# Patient Record
Sex: FEMALE | Race: WHITE | Employment: FULL TIME | URBAN - METROPOLITAN AREA
[De-identification: names, ages, dates, MRNs, and addresses within clinical notes are randomized per-mention and may not be internally consistent; named-entity substitution may affect disease eponyms.]

---

## 2017-02-27 ENCOUNTER — TELEPHONE (OUTPATIENT)
Dept: OTHER | Facility: CLINIC | Age: 47
End: 2017-02-27

## 2017-10-26 VITALS
HEART RATE: 74 BPM | HEIGHT: 67 IN | DIASTOLIC BLOOD PRESSURE: 76 MMHG | BODY MASS INDEX: 26.21 KG/M2 | WEIGHT: 167 LBS | SYSTOLIC BLOOD PRESSURE: 118 MMHG

## 2017-10-30 ENCOUNTER — RESULT FOLLOW UP (OUTPATIENT)
Dept: OBGYN | Facility: CLINIC | Age: 47
End: 2017-10-30

## 2017-10-30 ENCOUNTER — OFFICE VISIT (OUTPATIENT)
Dept: OBGYN | Facility: CLINIC | Age: 47
End: 2017-10-30

## 2017-10-30 ENCOUNTER — RADIANT APPOINTMENT (OUTPATIENT)
Dept: MAMMOGRAPHY | Facility: CLINIC | Age: 47
End: 2017-10-30
Payer: COMMERCIAL

## 2017-10-30 VITALS
DIASTOLIC BLOOD PRESSURE: 80 MMHG | WEIGHT: 166 LBS | SYSTOLIC BLOOD PRESSURE: 120 MMHG | BODY MASS INDEX: 26.06 KG/M2 | HEIGHT: 67 IN

## 2017-10-30 DIAGNOSIS — R87.810 ASCUS WITH POSITIVE HIGH RISK HPV CERVICAL: ICD-10-CM

## 2017-10-30 DIAGNOSIS — Z01.419 ENCOUNTER FOR GYNECOLOGICAL EXAMINATION WITHOUT ABNORMAL FINDING: Primary | ICD-10-CM

## 2017-10-30 DIAGNOSIS — Z12.31 VISIT FOR SCREENING MAMMOGRAM: ICD-10-CM

## 2017-10-30 DIAGNOSIS — Z23 NEED FOR PNEUMOCOCCAL VACCINE: ICD-10-CM

## 2017-10-30 DIAGNOSIS — R87.610 ASCUS WITH POSITIVE HIGH RISK HPV CERVICAL: ICD-10-CM

## 2017-10-30 DIAGNOSIS — Z92.29 HISTORY OF HORMONE REPLACEMENT THERAPY: ICD-10-CM

## 2017-10-30 DIAGNOSIS — Z23 NEED FOR VACCINATION: ICD-10-CM

## 2017-10-30 DIAGNOSIS — Z23 NEED FOR PROPHYLACTIC VACCINATION AND INOCULATION AGAINST INFLUENZA: ICD-10-CM

## 2017-10-30 PROCEDURE — 90471 IMMUNIZATION ADMIN: CPT | Performed by: OBSTETRICS & GYNECOLOGY

## 2017-10-30 PROCEDURE — 90472 IMMUNIZATION ADMIN EACH ADD: CPT | Performed by: OBSTETRICS & GYNECOLOGY

## 2017-10-30 PROCEDURE — 88175 CYTOPATH C/V AUTO FLUID REDO: CPT | Performed by: OBSTETRICS & GYNECOLOGY

## 2017-10-30 PROCEDURE — 87624 HPV HI-RISK TYP POOLED RSLT: CPT | Performed by: OBSTETRICS & GYNECOLOGY

## 2017-10-30 PROCEDURE — 00000159 ZZHCL STATISTIC H-SEND OUTS PREP: Performed by: OBSTETRICS & GYNECOLOGY

## 2017-10-30 PROCEDURE — 90670 PCV13 VACCINE IM: CPT | Performed by: OBSTETRICS & GYNECOLOGY

## 2017-10-30 PROCEDURE — 90686 IIV4 VACC NO PRSV 0.5 ML IM: CPT | Performed by: OBSTETRICS & GYNECOLOGY

## 2017-10-30 PROCEDURE — 88141 CYTOPATH C/V INTERPRET: CPT | Performed by: OBSTETRICS & GYNECOLOGY

## 2017-10-30 PROCEDURE — 99396 PREV VISIT EST AGE 40-64: CPT | Mod: 25 | Performed by: OBSTETRICS & GYNECOLOGY

## 2017-10-30 PROCEDURE — G0202 SCR MAMMO BI INCL CAD: HCPCS | Mod: TC

## 2017-10-30 RX ORDER — ESTRADIOL 0.75 MG/1.25G
0.75 GEL TOPICAL DAILY
Status: CANCELLED | OUTPATIENT
Start: 2017-10-30

## 2017-10-30 ASSESSMENT — ANXIETY QUESTIONNAIRES
7. FEELING AFRAID AS IF SOMETHING AWFUL MIGHT HAPPEN: NOT AT ALL
5. BEING SO RESTLESS THAT IT IS HARD TO SIT STILL: NOT AT ALL
IF YOU CHECKED OFF ANY PROBLEMS ON THIS QUESTIONNAIRE, HOW DIFFICULT HAVE THESE PROBLEMS MADE IT FOR YOU TO DO YOUR WORK, TAKE CARE OF THINGS AT HOME, OR GET ALONG WITH OTHER PEOPLE: NOT DIFFICULT AT ALL
2. NOT BEING ABLE TO STOP OR CONTROL WORRYING: NOT AT ALL
GAD7 TOTAL SCORE: 0
1. FEELING NERVOUS, ANXIOUS, OR ON EDGE: NOT AT ALL
6. BECOMING EASILY ANNOYED OR IRRITABLE: NOT AT ALL
3. WORRYING TOO MUCH ABOUT DIFFERENT THINGS: NOT AT ALL

## 2017-10-30 ASSESSMENT — PATIENT HEALTH QUESTIONNAIRE - PHQ9
SUM OF ALL RESPONSES TO PHQ QUESTIONS 1-9: 0
5. POOR APPETITE OR OVEREATING: NOT AT ALL

## 2017-10-30 NOTE — MR AVS SNAPSHOT
"              After Visit Summary   10/30/2017    Molly Levin    MRN: [id number]           Patient Information     Date Of Birth          1970        Visit Information        Provider Department      10/30/2017 9:00 AM Erma Aparicio MD [facility]        Today's Diagnoses     Encounter for gynecological examination without abnormal finding    -  1    Need for prophylactic vaccination and inoculation against influenza        Need for pneumococcal vaccine        ASCUS with positive high risk HPV cervical        Need for vaccination        History of hormone replacement therapy           Follow-ups after your visit        Who to contact     If you have questions or need follow up information about today's clinic visit or your schedule please contact [facility] directly at [phone].  Normal or non-critical lab and imaging results will be communicated to you by MyChart, letter or phone within 4 business days after the clinic has received the results. If you do not hear from us within 7 days, please contact the clinic through EndoSpherehart or phone. If you have a critical or abnormal lab result, we will notify you by phone as soon as possible.  Submit refill requests through Plumzi or call your pharmacy and they will forward the refill request to us. Please allow 3 business days for your refill to be completed.          Additional Information About Your Visit        MyChart Information     Plumzi lets you send messages to your doctor, view your test results, renew your prescriptions, schedule appointments and more. To sign up, go to [email or web address] . Click on \"Log in\" on the left side of the screen, which will take you to the Welcome page. Then click on \"Sign up Now\" on the right side of the page.     You will be asked to enter the access code listed below, as well as some personal information. Please follow the directions to create your username and " "password.     Your access code is: [email or web address]  Expires: 2018 12:47 PM     Your access code will  in 90 days. If you need help or a new code, please call your [place] clinic or [phone].        Care EveryWhere ID     This is your Care EveryWhere ID. This could be used by other organizations to access your [place] medical records  [id number]        Your Vitals Were     Height Last Period Breastfeeding? BMI (Body Mass Index)          5' 7\" (1.702 m) 10/27/2017 (Exact Date) No 26 kg/m2         Blood Pressure from Last 3 Encounters:   10/30/17 120/80   07/15/15 118/76   14 128/86    Weight from Last 3 Encounters:   10/30/17 166 lb (75.3 kg)   07/15/15 167 lb (75.8 kg)              We Performed the Following     1st  Administration  [65047]     FLU VAC, SPLIT VIRUS IM > 3 YO (QUADRIVALENT) [00978]     HPV High Risk Types DNA Cervical     Pap imaged thin layer screen with HPV - recommended age 30 - 65     Pneumococcal vaccine 13 valent PCV13 IM (Prevnar) [29807]     Vaccine Administration, Each Additional [92796]        Primary Care Provider Office Phone # Fax #    [facility] [phone]       [address] [place] [address], [address]  [facility] [address]        Equal Access to Services     [facility] : Hadii eli young hadasho Sotacosali, waaxda luqadaha, qaybta kaalmada danelle, silvia fonseca . So [facility] [phone].    ATENCIÓN: Si habla español, tiene a garcia disposición servicios gratuitos de asistencia lingüística. Llame al [phone].    We comply with applicable federal civil rights laws and [place] laws. We do not discriminate on the basis of race, color, national origin, age, disability, sex, sexual orientation, or gender identity.            Thank you!     Thank you for choosing [facility]  for your care. Our goal is always to provide you with excellent care. Hearing back from our patients is one way we can " continue to improve our services. Please take a few minutes to complete the written survey that you may receive in the mail after your visit with us. Thank you!             Your Updated Medication List - Protect others around you: Learn how to safely use, store and throw away your medicines at www.disposemymeds.org.          This list is accurate as of: 10/30/17 12:47 PM.  Always use your most recent med list.                   Brand Name Dispense Instructions for use Diagnosis    estradiol 0.75 MG/1.25 GM (0.06%) Gel topical gel    ESTROGEL     Place 0.75 mg onto the skin daily        progesterone vaginal cream

## 2017-10-30 NOTE — PROGRESS NOTES
Molly is a 47 year old  female who presents for annual exam.     Besides routine health maintenance, she has no other health concerns today .    HPI: Molly presents for annual exam. States that she is still having her menses on hormone replacement. She was placed on hormone replacement 8 years prior by her PCP. At the time she was having vaginal dryness and hot flushes. She has not yet had a 12 month period without menses. She actually states that she continues to have Mittleschmertz monthly. The hormone replacement caused her hot flushes to stop and treated her vaginal dryness as well. She uses the creams topically on her skin. She uses the estrogen in the AM and the progesterone in the PM.      GYNECOLOGIC HISTORY:    Patient's last menstrual period was 10/27/2017 (exact date).  Her current contraception method is: vasectomy.  She  reports that she has never smoked. She has never used smokeless tobacco.      Patient is sexually active.  STD testing offered?  Declined  Last PHQ-9 score on record =   PHQ-9 SCORE 10/30/2017   Total Score 0     Last GAD7 score on record =   LINDA-7 SCORE 10/30/2017   Total Score 0     Alcohol Score = 2    HEALTH MAINTENANCE:  Cholesterol: Done with PCP  Last Mammo: 2015, Result: normal, Next Mammo: today   Pap: 2015 - ASC-US, HPV+   Colonoscopy:  Never, Result: not applicable, Next Colonoscopy: Age 49  Dexa: Never    Health maintenance updated:  yes    HISTORY:  Obstetric History       T2      L2     SAB0   TAB0   Ectopic0   Multiple0   Live Births2       # Outcome Date GA Lbr Salvador/2nd Weight Sex Delivery Anes PTL Lv   2 Term         ADORE   1 Term         ADORE          Patient Active Problem List   Diagnosis     History of hormone replacement therapy     ASCUS with positive high risk HPV cervical     Past Surgical History:   Procedure Laterality Date     LAPAROSCOPY      lysis of adhesions     SURGICAL PATHOLOGY EXAM      LEEP CONE: ANDREA III  per records  "from her primary doctor      Social History   Substance Use Topics     Smoking status: Never Smoker     Smokeless tobacco: Never Used     Alcohol use 1.0 oz/week     2 Glasses of wine per week           Current Outpatient Prescriptions   Medication Sig     progesterone vaginal cream      estradiol (ESTROGEL) 0.75 MG/1.25 GM (0.06%) GEL Place 0.75 mg onto the skin daily     No current facility-administered medications for this visit.      No Known Allergies    Past medical, surgical, social and family histories were reviewed and updated in EPIC.    ROS:   12 point review of systems negative other than symptoms noted below.    EXAM:  /80  Ht 5' 7\" (1.702 m)  Wt 166 lb (75.3 kg)  LMP 10/27/2017 (Exact Date)  Breastfeeding? No  BMI 26 kg/m2   BMI: Body mass index is 26 kg/(m^2).    PHYSICAL EXAM:  Constitutional:  Appearance: Well nourished, well developed, alert, in no acute distress  Neck:  Lymph Nodes:  No lymphadenopathy present    Thyroid:  Gland size normal, nontender, no nodules or masses present on palpation  Chest:  Respiratory Effort:  Breathing unlabored, clear to auscultation bilaterally  Cardiovascular:    Heart: Auscultation:  Regular rate, normal rhythm, no murmurs present  Breasts: Palpation of Breasts and Axillae:  Fibrous and dense breasts bilaterally. No masses present on palpation, no breast tenderness., Axillary Lymph Nodes:  No lymphadenopathy present., No nodularity, asymmetry or nipple discharge bilaterally. and On inspection no skin changes.  Gastrointestinal:   Abdominal Examination:  Abdomen nontender to palpation, tone normal without rigidity or guarding, no masses present, umbilicus without lesions   Liver and Spleen:  No hepatomegaly present, liver nontender to palpation    Hernias:  No hernias present  Lymphatic: Lymph Nodes:  No other lymphadenopathy present  Skin:  General Inspection:  No rashes present, no lesions present, no areas of  discoloration    Genitalia and Groin:  " No rashes present, no lesions present, no areas of  discoloration, no masses present  Neurologic/Psychiatric:    Mental Status:  Oriented X3     Pelvic Exam:  External Genitalia:     Normal appearance for age, no discharge present, no tenderness present, no inflammatory lesions present, color normal  Vagina:     Normal vaginal vault without central or paravaginal defects, minimal menses in vaginal vault. No discharge present, no inflammatory lesions present, no masses present  Bladder:     Nontender to palpation  Urethra:   Urethral Body:  Urethra palpation normal, urethra structural support normal   Urethral Meatus:  No erythema or lesions present  Cervix:     Appearance healthy, no lesions present, nontender to palpation, minimal menses present.  Uterus:     Uterus: firm, normal sized and nontender, anteverted in position.   Adnexa:     No adnexal tenderness present, no adnexal masses present  Perineum:     Perineum within normal limits, no evidence of trauma, no rashes or skin lesions present  Anus:     Anus within normal limits, no hemorrhoids present  Inguinal Lymph Nodes:     No lymphadenopathy present  Pubic Hair:     Normal pubic hair distribution for age  Genitalia and Groin:     No rashes present, no lesions present, no areas of discoloration, no masses present      COUNSELING:   Reviewed preventive health counseling, as reflected in patient instructions       Regular exercise       Healthy diet/nutrition    BMI: Body mass index is 26 kg/(m^2).      ASSESSMENT:  47 year old female with satisfactory annual exam.    ICD-10-CM    1. Encounter for gynecological examination without abnormal finding Z01.419    2. Need for prophylactic vaccination and inoculation against influenza Z23 Pneumococcal vaccine 13 valent PCV13 IM (Prevnar) [86552]     1st  Administration  [48615]     FLU VAC, SPLIT VIRUS IM > 3 YO (QUADRIVALENT) [29941]     Vaccine Administration, Each Additional [56134]   3. Need for pneumococcal  vaccine Z23    4. ASCUS with positive high risk HPV cervical R87.610 Pap imaged thin layer screen with HPV - recommended age 30 - 65    R87.810 HPV High Risk Types DNA Cervical   5. Need for vaccination Z23    6. History of hormone replacement therapy Z79.890        PLAN:  Pap smear performed as last pap smear was ASCUS/high risk HPV in 2015.  Vaccination given today  Hormone replacement therapy: as I am not familiar with the specific regimen Molly is on I deferred her refill to her original prescribing provider and she agreed. I did discuss with her that if she develops abnormal uterine bleeding we should investigate with an ultrasound and endometrial biopsy. I informed her of the importance of estrogen and progesterone both being balanced in HRT to ensure a normal endometrial lining. I discussed unopposed estrogen and the increased risk of uterine malignancy. I admitted to her that I am unfamiliar with the absorbed levels or either hormone in the specific regimen she is using.      Erma Aparicio MD

## 2017-10-30 NOTE — LETTER
November 13, 2018      Molly Levin  [address] [place] [address]    Dear MsAnalisaOllie,      At [facility], your health and wellness is our primary concern. That is why we are following up on a colposcopy from 11/20/17. Your provider had recommended that you have a Pap smear and HPV test completed by 11/20/18. Our records do not show that this has been scheduled.    It is important to complete the follow up that your provider has suggested for you to ensure that there are no worsening changes which may, over time, develop into cancer.      Please contact our office at  [phone] to schedule an appointment for a Pap smear and HPV test at your earliest convenience. If you have questions or concerns, please call the clinic and we will be happy to assist you.    If you have completed the tests outside of [facility], please have the results forwarded to our office. We will update the chart for your primary Physician to review before your next annual physical.     Thank you for choosing [facility]!    Sincerely,      Erma Aparicio MD/[facility]

## 2017-10-30 NOTE — PROGRESS NOTES
Injectable Influenza Immunization Documentation    1.  Is the person to be vaccinated sick today?   No    2. Does the person to be vaccinated have an allergy to a component   of the vaccine?   No  Egg Allergy Algorithm Link    3. Has the person to be vaccinated ever had a serious reaction   to influenza vaccine in the past?   No    4. Has the person to be vaccinated ever had Guillain-Barré syndrome?   No    Form completed by Casie Jones, DUNIA    Screening Questionnaire for Adult Immunization    Are you sick today?   No   Do you have allergies to medications, food, a vaccine component or latex?   No   Have you ever had a serious reaction after receiving a vaccination?   No   Do you have a long-term health problem with heart disease, lung disease, asthma, kidney disease, metabolic disease (e.g. diabetes), anemia, or other blood disorder?   No   Do you have cancer, leukemia, HIV/AIDS, or any other immune system problem?   No   In the past 3 months, have you taken medications that affect  your immune system, such as prednisone, other steroids, or anticancer drugs; drugs for the treatment of rheumatoid arthritis, Crohn s disease, or psoriasis; or have you had radiation treatments?   No   Have you had a seizure, or a brain or other nervous system problem?   No   During the past year, have you received a transfusion of blood or blood     products, or been given immune (gamma) globulin or antiviral drug?   No   For women: Are you pregnant or is there a chance you could become        pregnant during the next month?   No   Have you received any vaccinations in the past 4 weeks?   No     Immunization questionnaire answers were all negative.        Per orders of Dr. Aparicio, injection of Prevnar given by Casie Jones. Patient instructed to remain in clinic for 15 minutes afterwards, and to report any adverse reaction to me immediately.       Screening performed by Casie Jones on 10/30/2017 at 10:06 AM.

## 2017-10-31 ASSESSMENT — ANXIETY QUESTIONNAIRES: GAD7 TOTAL SCORE: 0

## 2017-11-03 LAB
COPATH REPORT: ABNORMAL
PAP: ABNORMAL

## 2017-11-06 LAB
FINAL DIAGNOSIS: ABNORMAL
HPV HR 12 DNA CVX QL NAA+PROBE: POSITIVE
HPV16 DNA SPEC QL NAA+PROBE: NEGATIVE
HPV18 DNA SPEC QL NAA+PROBE: NEGATIVE
SPECIMEN DESCRIPTION: ABNORMAL

## 2017-11-07 NOTE — PROGRESS NOTES
2000 LEEP cone ANDREA III  6/26/15 ASCUS/+ HR HPV. Plan: colpo  7/15/15 Colpo-benign   10/30/17 ASCUS/+ HR HPV (not 16/18).endometrial cells noted.  LMP 10/27/17.  Plan: colposcopy by 1/30/18 11/9/17 pt advised of result and follow up  11/20/17 Colpo. Bx/ECC-benign.  Plan: cotest in 12 months.   11/13/18 Cotest reminder letter sent. ([facility])  12/04/18 LMTC and schedule at [facility]. ([facility])  12/18/18 Patient is lost to pap tracking follow-up. FYI routed to provider. ([facility])

## 2017-11-16 ENCOUNTER — TELEPHONE (OUTPATIENT)
Dept: OBGYN | Facility: CLINIC | Age: 47
End: 2017-11-16

## 2017-11-16 NOTE — TELEPHONE ENCOUNTER
"Informed pt of results: \"Breast Imaging Result: We are pleased to inform you that the results of your recent breast imaging show no evidence of malignancy (cancer).\"  She stated understanding and had no further questions.   "

## 2017-11-20 ENCOUNTER — OFFICE VISIT (OUTPATIENT)
Dept: OBGYN | Facility: CLINIC | Age: 47
End: 2017-11-20
Payer: COMMERCIAL

## 2017-11-20 VITALS — SYSTOLIC BLOOD PRESSURE: 130 MMHG | DIASTOLIC BLOOD PRESSURE: 72 MMHG | BODY MASS INDEX: 26.06 KG/M2 | WEIGHT: 166.4 LBS

## 2017-11-20 DIAGNOSIS — Z01.812 PRE-PROCEDURE LAB EXAM: ICD-10-CM

## 2017-11-20 DIAGNOSIS — R87.810 ASCUS WITH POSITIVE HIGH RISK HPV CERVICAL: Primary | ICD-10-CM

## 2017-11-20 DIAGNOSIS — R87.610 ASCUS WITH POSITIVE HIGH RISK HPV CERVICAL: Primary | ICD-10-CM

## 2017-11-20 LAB — BETA HCG QUAL IFA URINE: NEGATIVE

## 2017-11-20 PROCEDURE — 84703 CHORIONIC GONADOTROPIN ASSAY: CPT | Performed by: OBSTETRICS & GYNECOLOGY

## 2017-11-20 PROCEDURE — 88305 TISSUE EXAM BY PATHOLOGIST: CPT | Performed by: OBSTETRICS & GYNECOLOGY

## 2017-11-20 PROCEDURE — 57454 BX/CURETT OF CERVIX W/SCOPE: CPT | Performed by: OBSTETRICS & GYNECOLOGY

## 2017-11-20 ASSESSMENT — PATIENT HEALTH QUESTIONNAIRE - PHQ9: SUM OF ALL RESPONSES TO PHQ QUESTIONS 1-9: 0

## 2017-11-20 NOTE — MR AVS SNAPSHOT
"              After Visit Summary   2017    Molly Levin    MRN: [id number]           Patient Information     Date Of Birth          1970        Visit Information        Provider Department      2017 9:40 AM Erma Aparicio MD; [facility]        Today's Diagnoses     ASCUS with positive high risk HPV cervical    -  1    Pre-procedure lab exam           Follow-ups after your visit        Who to contact     If you have questions or need follow up information about today's clinic visit or your schedule please contact [facility] directly at [phone].  Normal or non-critical lab and imaging results will be communicated to you by MyChart, letter or phone within 4 business days after the clinic has received the results. If you do not hear from us within 7 days, please contact the clinic through LearnStreethart or phone. If you have a critical or abnormal lab result, we will notify you by phone as soon as possible.  Submit refill requests through Potential or call your pharmacy and they will forward the refill request to us. Please allow 3 business days for your refill to be completed.          Additional Information About Your Visit        MyChart Information     Potential lets you send messages to your doctor, view your test results, renew your prescriptions, schedule appointments and more. To sign up, go to [email or web address] . Click on \"Log in\" on the left side of the screen, which will take you to the Welcome page. Then click on \"Sign up Now\" on the right side of the page.     You will be asked to enter the access code listed below, as well as some personal information. Please follow the directions to create your username and password.     Your access code is: [email or web address]  Expires: 2018 11:47 AM     Your access code will  in 90 days. If you need help or a new code, please call your [facility] or [phone].        Care " EveryWhere ID     This is your Care EveryWhere ID. This could be used by other organizations to access your [place] medical records  [id number]        Your Vitals Were     Last Period BMI (Body Mass Index)                10/27/2017 (Exact Date) 26.06 kg/m2           Blood Pressure from Last 3 Encounters:   11/20/17 130/72   10/30/17 120/80   07/15/15 118/76    Weight from Last 3 Encounters:   11/20/17 166 lb 6.4 oz (75.5 kg)   10/30/17 166 lb (75.3 kg)   07/15/15 167 lb (75.8 kg)              We Performed the Following     Beta HCG qual IFA urine - [facility] and [facility]     COLPOSCOPY CERVIX/UPPER VAGINA W BX CERVIX     Surgical pathology exam        Primary Care Provider Office Phone # Fax #    [facility] [phone]       [facility], [address]  [facility] [address]        Equal Access to Services     BLOSSOM RAMESH AH: Hadii aad ku hadasho Soomaali, waaxda luqadaha, qaybta kaalmada adeegyada, waxay enriquetain hayyuniorn ramila fonseca . So [facility] [phone].    ATENCIÓN: Si jae awad, tiene a garcia disposición servicios gratuitos de asistencia lingüística. Llame al [phone].    We comply with applicable federal civil rights laws and [place] laws. We do not discriminate on the basis of race, color, national origin, age, disability, sex, sexual orientation, or gender identity.            Thank you!     Thank you for choosing [facility] [place]  for your care. Our goal is always to provide you with excellent care. Hearing back from our patients is one way we can continue to improve our services. Please take a few minutes to complete the written survey that you may receive in the mail after your visit with us. Thank you!             Your Updated Medication List - Protect others around you: Learn how to safely use, store and throw away your medicines at www.disposemymeds.org.          This list is accurate as of: 11/20/17 10:30 AM.  Always use your most  recent med list.                   Brand Name Dispense Instructions for use Diagnosis    estradiol 0.75 MG/1.25 GM (0.06%) Gel topical gel    ESTROGEL     Place 0.75 mg onto the skin daily        progesterone vaginal cream

## 2017-11-20 NOTE — PROGRESS NOTES
INDICATIONS:                                                    Is a pregnancy test required: Yes.  Was it positive or negative?  Negative  Was a consent obtained?  Yes    Today's PHQ-2 Score:   PHQ-2 ( 1999 Pfizer) 11/20/2017   Q1: Little interest or pleasure in doing things 0   Q2: Feeling down, depressed or hopeless 0   PHQ-2 Score 0     Today's PHQ-9 Score:    PHQ-9 SCORE 11/20/2017   Total Score 0     Today's GAD7 Score: 0    Molly Levin, is a 47 year old female, who had a recent ASCUS pap.  HPV positive.  Yes prior history of abnormal pap. ASCUS/ HR HPV in 2015 with negative colposcopy. She had a LEEP procedure performed in 1999. Here today for colposcopy. Discussed indication, risks of infection and bleeding. Stated that she would like to avoid a hysterectomy if possible.    Her last pap was   Lab Results   Component Value Date    PAP ASC-US 10/30/2017    .    PROCEDURE:                                                      Cervix is stained with acetic acid and viewed colposcopically. Attenuated cervix from prior LEEP. Squamocolumnar junction is visualized in it's entirity. acetowhite lesion(s) noted at 12 and 6 o'clock . Biopsy done Yes. Endocervical curretage Done             POST PROCEDURE:                                                      IMPRESSION: colposcopy adequate, HPV and ANDREA 1-2    PLAN : Await the results of the biopsies.  Repeat pap in 12 months.  She  tolerated the procedure well. There were no complications. Patient was discharged in stable condition.    Patient advised to call the clinic if excessive bleeding, pelvic pain, or fever.     Follow-up plan based on pathology results.    Erma Aparicio MD  [facility]

## 2017-11-22 LAB — COPATH REPORT: NORMAL

## 2018-12-04 ENCOUNTER — TELEPHONE (OUTPATIENT)
Dept: OBGYN | Facility: CLINIC | Age: 48
End: 2018-12-04

## 2018-12-04 NOTE — TELEPHONE ENCOUNTER
Pt is past due for f/u pap smear after colposcopy.  LMTC and schedule at [facility].  Shanna Tirado,    Pap Tracking

## 2019-03-18 ENCOUNTER — OFFICE VISIT (OUTPATIENT)
Dept: OBGYN | Facility: CLINIC | Age: 49
End: 2019-03-18
Payer: COMMERCIAL

## 2019-03-18 ENCOUNTER — ANCILLARY PROCEDURE (OUTPATIENT)
Dept: MAMMOGRAPHY | Facility: CLINIC | Age: 49
End: 2019-03-18
Payer: COMMERCIAL

## 2019-03-18 VITALS
SYSTOLIC BLOOD PRESSURE: 112 MMHG | HEART RATE: 72 BPM | HEIGHT: 67 IN | BODY MASS INDEX: 23.07 KG/M2 | WEIGHT: 147 LBS | DIASTOLIC BLOOD PRESSURE: 72 MMHG

## 2019-03-18 DIAGNOSIS — R87.610 ASCUS WITH POSITIVE HIGH RISK HPV CERVICAL: ICD-10-CM

## 2019-03-18 DIAGNOSIS — Z12.31 VISIT FOR SCREENING MAMMOGRAM: ICD-10-CM

## 2019-03-18 DIAGNOSIS — Z01.419 ENCOUNTER FOR GYNECOLOGICAL EXAMINATION WITHOUT ABNORMAL FINDING: Primary | ICD-10-CM

## 2019-03-18 DIAGNOSIS — R87.810 ASCUS WITH POSITIVE HIGH RISK HPV CERVICAL: ICD-10-CM

## 2019-03-18 PROCEDURE — 88141 CYTOPATH C/V INTERPRET: CPT | Performed by: OBSTETRICS & GYNECOLOGY

## 2019-03-18 PROCEDURE — 88175 CYTOPATH C/V AUTO FLUID REDO: CPT | Performed by: OBSTETRICS & GYNECOLOGY

## 2019-03-18 PROCEDURE — 87624 HPV HI-RISK TYP POOLED RSLT: CPT | Performed by: OBSTETRICS & GYNECOLOGY

## 2019-03-18 PROCEDURE — 77067 SCR MAMMO BI INCL CAD: CPT | Mod: TC

## 2019-03-18 PROCEDURE — 99396 PREV VISIT EST AGE 40-64: CPT | Performed by: OBSTETRICS & GYNECOLOGY

## 2019-03-18 PROCEDURE — 77063 BREAST TOMOSYNTHESIS BI: CPT | Mod: TC

## 2019-03-18 ASSESSMENT — ANXIETY QUESTIONNAIRES
7. FEELING AFRAID AS IF SOMETHING AWFUL MIGHT HAPPEN: NOT AT ALL
GAD7 TOTAL SCORE: 0
5. BEING SO RESTLESS THAT IT IS HARD TO SIT STILL: NOT AT ALL
2. NOT BEING ABLE TO STOP OR CONTROL WORRYING: NOT AT ALL
6. BECOMING EASILY ANNOYED OR IRRITABLE: NOT AT ALL
3. WORRYING TOO MUCH ABOUT DIFFERENT THINGS: NOT AT ALL
1. FEELING NERVOUS, ANXIOUS, OR ON EDGE: NOT AT ALL
IF YOU CHECKED OFF ANY PROBLEMS ON THIS QUESTIONNAIRE, HOW DIFFICULT HAVE THESE PROBLEMS MADE IT FOR YOU TO DO YOUR WORK, TAKE CARE OF THINGS AT HOME, OR GET ALONG WITH OTHER PEOPLE: NOT DIFFICULT AT ALL

## 2019-03-18 ASSESSMENT — MIFFLIN-ST. JEOR: SCORE: 1320.45

## 2019-03-18 ASSESSMENT — PATIENT HEALTH QUESTIONNAIRE - PHQ9
SUM OF ALL RESPONSES TO PHQ QUESTIONS 1-9: 0
5. POOR APPETITE OR OVEREATING: NOT AT ALL

## 2019-03-18 NOTE — PROGRESS NOTES
Molly is a 48 year old  female who presents for annual exam.     Besides routine health maintenance, she has no other health concerns today .    HPI:  The patient's PCP is [facility].  Molly presents for an annual GYN visit and a repeat pap smear. Since her last visit she has made some lifestyle changes of eliminating sugar, dairy and gluten from her diet. She is also exercising more and has lost 20 pounds and feels great. 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 She lives at home with her  and denies intimate partner violence.       GYNECOLOGIC HISTORY:    Patient's last menstrual period was 12/15/2018 (approximate).  Her current contraception method is: vasectomy.  She  reports that she has quit smoking. she has never used smokeless tobacco.    Patient is sexually active.  STD testing offered?  Declined  Last PHQ-9 score on record =   PHQ-9 SCORE 3/18/2019   PHQ-9 Total Score 0     Last GAD7 score on record =   LINDA-7 SCORE 3/18/2019   Total Score 0     Alcohol Score = 2    HEALTH MAINTENANCE:  Cholesterol: None found.  Last Mammo: 10/30/17, Result: normal, Next Mammo: today   Pap:   Lab Results   Component Value Date    PAP ASC-US, HPV+ 10/30/2017    Colpo normal  Colonoscopy:  Never, Result: not applicable, Next Colonoscopy: 1 year.  Dexa:  never    Health maintenance updated:  no, will do pap w/ co-testing today, along with mammo    HISTORY:  Obstetric History       T2      L2     SAB0   TAB0   Ectopic0   Multiple0   Live Births2       # Outcome Date GA Lbr Salvador/2nd Weight Sex Delivery Anes PTL Lv   2 Term         ADORE   1 Term         ADORE          Patient Active Problem List   Diagnosis     History of hormone replacement therapy     ASCUS with positive high risk HPV cervical     Past Surgical History:   Procedure Laterality Date     LAPAROSCOPY      lysis of adhesions     SURGICAL PATHOLOGY EXAM      LEEP CONE: ANDREA III  per records from  "her primary doctor      Social History     Tobacco Use     Smoking status: Former Smoker     Smokeless tobacco: Never Used     Tobacco comment: in the 80s   Substance Use Topics     Alcohol use: Yes     Alcohol/week: 1.0 oz     Types: 2 Glasses of wine per week           Current Outpatient Medications   Medication Sig     estradiol (ESTROGEL) 0.75 MG/1.25 GM (0.06%) GEL Place 0.75 mg onto the skin daily     progesterone vaginal cream      No current facility-administered medications for this visit.      No Known Allergies    Past medical, surgical, social and family histories were reviewed and updated in EPIC.    ROS:   12 point review of systems negative other than symptoms noted below.  Genitourinary: Hot Flashes, Night Sweats and No Periods    EXAM:  /72   Pulse 72   Ht 1.695 m (5' 6.75\")   Wt 66.7 kg (147 lb)   LMP 12/15/2018 (Approximate)   BMI 23.20 kg/m     BMI: Body mass index is 23.2 kg/m .    PHYSICAL EXAM:  Constitutional:  Appearance: Well nourished, well developed, alert, in no acute distress  Neck:  Lymph Nodes:  No lymphadenopathy present    Thyroid:  Gland size normal, nontender, no nodules or masses present on palpation  Chest:  Respiratory Effort:  Breathing unlabored, CTAB  Cardiovascular:    Heart: Auscultation:  Regular rate, normal rhythm, no murmurs present  Breasts: Palpation of Breasts and Axillae:  No masses present on palpation, no breast tenderness., Axillary Lymph Nodes:  No lymphadenopathy present., No nodularity, asymmetry or nipple discharge bilaterally. and no skin changes noted.  Gastrointestinal:   Abdominal Examination:  Abdomen nontender to palpation, tone normal without rigidity or guarding, no masses present, umbilicus without lesions   Liver and Spleen:  No hepatomegaly present, liver nontender to palpation    Hernias:  No hernias present  Lymphatic: Lymph Nodes:  No other lymphadenopathy present  Skin:  General Inspection:  No rashes present, no lesions present, no " areas of  discoloration    Genitalia and Groin:  No rashes present, no lesions present, no areas of  discoloration, no masses present  Neurologic/Psychiatric:    Mental Status:  Oriented X3     Pelvic Exam:  External Genitalia:     Normal appearance for age, no discharge present, no tenderness present, no inflammatory lesions present, color normal  Vagina:     Normal vaginal vault without central or paravaginal defects, no discharge present, no inflammatory lesions present, no masses present  Bladder:     Nontender to palpation  Urethra:   Urethral Body:  Urethra palpation normal, urethra structural support normal   Urethral Meatus:  No erythema or lesions present  Cervix:     Appearance healthy, no lesions present, nontender to palpation, no bleeding present  Uterus:     Uterus: firm, normal sized and nontender, anteverted in position.   Adnexa:     No adnexal tenderness present, no adnexal masses present  Perineum:     Perineum within normal limits, no evidence of trauma, no rashes or skin lesions present  Anus:     Anus within normal limits, no hemorrhoids present  Genitalia and Groin:     No rashes present, no lesions present, no areas of discoloration, no masses present      COUNSELING:   Reviewed preventive health counseling, as reflected in patient instructions       Regular exercise       Healthy diet/nutrition    BMI: Body mass index is 23.2 kg/m .      ASSESSMENT:  48 year old female with satisfactory annual exam.    ICD-10-CM    1. Encounter for gynecological examination without abnormal finding Z01.419 Pap imaged thin layer screen with HPV - recommended age 30 - 65     HPV High Risk Types DNA Cervical   2. ASCUS with positive high risk HPV cervical R87.610 Pap imaged thin layer screen with HPV - recommended age 30 - 65    R87.810 HPV High Risk Types DNA Cervical       PLAN:  Co-testing performed today due to ASCUS pap previously. We discussed what would be an indication for colposcopy.  Mammogram today  as well  Cholesterol screen and DM screening via PCP in May.     Erma Aparicio MD

## 2019-03-18 NOTE — LETTER
March 25, 2019    Molly Levin  [address] [place] [id number]    Dear ,  This letter is regarding your recent Pap smear (cervical cancer screening) and Human Papillomavirus (HPV) test.  We are happy to inform you that your Pap smear result is normal. Cervical cancer is closely linked with certain types of HPV. Your results showed no evidence of high-risk HPV.  Therefore we recommend you return in 3 years for your next pap smear and HPV test.  You will still need to return to the clinic every year for an annual exam and other preventive tests.  If you have additional questions regarding this result, please call our registered nurse, Antonette at [phone].  Sincerely,    Erma Aparciio MD/[place]

## 2019-03-19 ASSESSMENT — ANXIETY QUESTIONNAIRES: GAD7 TOTAL SCORE: 0

## 2019-03-21 LAB
COPATH REPORT: NORMAL
PAP: NORMAL

## 2019-03-22 LAB
FINAL DIAGNOSIS: NORMAL
HPV HR 12 DNA CVX QL NAA+PROBE: NEGATIVE
HPV16 DNA SPEC QL NAA+PROBE: NEGATIVE
HPV18 DNA SPEC QL NAA+PROBE: NEGATIVE
SPECIMEN DESCRIPTION: NORMAL
SPECIMEN SOURCE CVX/VAG CYTO: NORMAL

## 2019-03-25 PROBLEM — R87.810 ASCUS WITH POSITIVE HIGH RISK HPV CERVICAL: Status: ACTIVE | Noted: 2017-10-30

## 2019-03-25 PROBLEM — R87.610 ASCUS WITH POSITIVE HIGH RISK HPV CERVICAL: Status: ACTIVE | Noted: 2017-10-30

## 2024-06-14 ENCOUNTER — ANESTHESIA EVENT (OUTPATIENT)
Dept: SURGERY | Facility: AMBULATORY SURGERY CENTER | Age: 54
End: 2024-06-14

## 2024-06-17 ENCOUNTER — HOSPITAL ENCOUNTER (OUTPATIENT)
Facility: AMBULATORY SURGERY CENTER | Age: 54
Discharge: HOME | End: 2024-06-17
Attending: COLON & RECTAL SURGERY

## 2024-06-17 ENCOUNTER — ANESTHESIA (OUTPATIENT)
Dept: SURGERY | Facility: AMBULATORY SURGERY CENTER | Age: 54
End: 2024-06-17

## 2024-06-17 VITALS
WEIGHT: 155 LBS | HEART RATE: 71 BPM | DIASTOLIC BLOOD PRESSURE: 62 MMHG | RESPIRATION RATE: 16 BRPM | HEIGHT: 67 IN | SYSTOLIC BLOOD PRESSURE: 101 MMHG | TEMPERATURE: 97.2 F | OXYGEN SATURATION: 96 % | BODY MASS INDEX: 24.33 KG/M2

## 2024-06-17 DIAGNOSIS — Z12.11 ENCOUNTER FOR SCREENING COLONOSCOPY: ICD-10-CM

## 2024-06-17 LAB — COLONOSCOPY: NORMAL

## 2024-06-17 RX ORDER — PROPOFOL 10 MG/ML
INJECTION, EMULSION INTRAVENOUS PRN
Status: DISCONTINUED | OUTPATIENT
Start: 2024-06-17 | End: 2024-06-17

## 2024-06-17 RX ORDER — ONDANSETRON 4 MG/1
4 TABLET, ORALLY DISINTEGRATING ORAL EVERY 30 MIN PRN
Status: DISCONTINUED | OUTPATIENT
Start: 2024-06-17 | End: 2024-06-18 | Stop reason: HOSPADM

## 2024-06-17 RX ORDER — ONDANSETRON 2 MG/ML
4 INJECTION INTRAMUSCULAR; INTRAVENOUS EVERY 30 MIN PRN
Status: DISCONTINUED | OUTPATIENT
Start: 2024-06-17 | End: 2024-06-18 | Stop reason: HOSPADM

## 2024-06-17 RX ORDER — FENTANYL CITRATE 0.05 MG/ML
25 INJECTION, SOLUTION INTRAMUSCULAR; INTRAVENOUS
Status: DISCONTINUED | OUTPATIENT
Start: 2024-06-17 | End: 2024-06-18 | Stop reason: HOSPADM

## 2024-06-17 RX ORDER — LIDOCAINE 40 MG/G
CREAM TOPICAL
Status: DISCONTINUED | OUTPATIENT
Start: 2024-06-17 | End: 2024-06-18 | Stop reason: HOSPADM

## 2024-06-17 RX ORDER — NALOXONE HYDROCHLORIDE 0.4 MG/ML
0.1 INJECTION, SOLUTION INTRAMUSCULAR; INTRAVENOUS; SUBCUTANEOUS
Status: DISCONTINUED | OUTPATIENT
Start: 2024-06-17 | End: 2024-06-18 | Stop reason: HOSPADM

## 2024-06-17 RX ORDER — SODIUM CHLORIDE, SODIUM LACTATE, POTASSIUM CHLORIDE, CALCIUM CHLORIDE 600; 310; 30; 20 MG/100ML; MG/100ML; MG/100ML; MG/100ML
INJECTION, SOLUTION INTRAVENOUS CONTINUOUS
Status: DISCONTINUED | OUTPATIENT
Start: 2024-06-17 | End: 2024-06-18 | Stop reason: HOSPADM

## 2024-06-17 RX ORDER — DEXAMETHASONE SODIUM PHOSPHATE 4 MG/ML
4 INJECTION, SOLUTION INTRA-ARTICULAR; INTRALESIONAL; INTRAMUSCULAR; INTRAVENOUS; SOFT TISSUE
Status: DISCONTINUED | OUTPATIENT
Start: 2024-06-17 | End: 2024-06-18 | Stop reason: HOSPADM

## 2024-06-17 RX ORDER — PROPOFOL 10 MG/ML
INJECTION, EMULSION INTRAVENOUS CONTINUOUS PRN
Status: DISCONTINUED | OUTPATIENT
Start: 2024-06-17 | End: 2024-06-17

## 2024-06-17 RX ORDER — LIDOCAINE HYDROCHLORIDE 20 MG/ML
INJECTION, SOLUTION INFILTRATION; PERINEURAL PRN
Status: DISCONTINUED | OUTPATIENT
Start: 2024-06-17 | End: 2024-06-17

## 2024-06-17 RX ADMIN — LIDOCAINE HYDROCHLORIDE 60 MG: 20 INJECTION, SOLUTION INFILTRATION; PERINEURAL at 09:53

## 2024-06-17 RX ADMIN — SODIUM CHLORIDE, SODIUM LACTATE, POTASSIUM CHLORIDE, CALCIUM CHLORIDE: 600; 310; 30; 20 INJECTION, SOLUTION INTRAVENOUS at 09:49

## 2024-06-17 RX ADMIN — PROPOFOL 50 MG: 10 INJECTION, EMULSION INTRAVENOUS at 09:54

## 2024-06-17 RX ADMIN — PROPOFOL 50 MG: 10 INJECTION, EMULSION INTRAVENOUS at 10:11

## 2024-06-17 RX ADMIN — PROPOFOL 50 MG: 10 INJECTION, EMULSION INTRAVENOUS at 09:58

## 2024-06-17 RX ADMIN — PROPOFOL 200 MCG/KG/MIN: 10 INJECTION, EMULSION INTRAVENOUS at 09:53

## 2024-06-17 ASSESSMENT — LIFESTYLE VARIABLES: TOBACCO_USE: 1

## 2024-06-17 NOTE — DISCHARGE INSTRUCTIONS
Post-Colonoscopy Discharge Instructions:    1. You have just had an examination of your colon (large intestine) called a colonoscopy. You should have a full report of the findings to take with you today. It will list if any polyps were removed or if any biopsies were taken to send to the laboratory. If we did take out polyps or do biopsies, you should get a letter in the next 1-2 weeks if they are the standard pre-cancerous polyps. If it is anything more serious, your doctor will call you. The timing of your next colonoscopy is determined by your family history of polyps/colon cancer and what findings were on your colonoscopy. [facility] will keep track of when you are due for your next colonoscopy and contact you.     2. No driving or operating heavy power equipment today.    3. Do not drink alcohol for 12 hours after the procedure.     4. Do not sign any important or legally binding papers today.    5. No strenuous activity today (its a great day for a nap and to lay on the couch and watch some TV!).     6. You can eat whatever you like after your procedure.     7. You may experience drowsiness or forgetfulness at first. You may also notice altered  bowel habits, excessive gas and belching or bloated feeling.    8. If recommended by your physician, you should avoid Aspirin, Aspirin products and   Arthritis medications for 7 - 10 days following the exam if biopsies or polypectomies   have been performed.    9. You may develop soreness or redness where your IV medication was given.    Apply warm wet cloths to the area for 15 minutes 3 - 4 times per day.    10. You may have a bloated, gaseous feeling in your abdomen after a  Colonoscopy for the next few hours. Passing gas and belching will help. Walking or lying down on your left side with your knees flexed may relieve the discomfort.    11. Call the office at [phone] right away if you notice any of the following:  a. Vomiting of blood  and/or  coffee ground  material.  b. Rectal bleeding - >1Tbsp, blood clots, or continuous bleeding.  c. Severe abdominal pain.  d. Chills, fever over 101 degrees F.  e. Persistent nausea or vomiting.  f. Persistent disorientation/confusion.  g. Persistent coughing or spitting up blood.  h. Persistent redness, hardness, or tenderness at IV site.

## 2024-06-17 NOTE — ANESTHESIA CARE TRANSFER NOTE
Patient: Molly Levin    Procedure: Procedure(s):  COLONOSCOPY WITH POLYPECTOMY       Diagnosis: Encounter for screening colonoscopy [Z12.11]  Diagnosis Additional Information: No value filed.    Anesthesia Type:   MAC     Note:    Oropharynx: oropharynx clear of all foreign objects and spontaneously breathing  Level of Consciousness: drowsy  Oxygen Supplementation: room air    Independent Airway: airway patency satisfactory and stable  Dentition: dentition unchanged  Vital Signs Stable: post-procedure vital signs reviewed and stable  Report to RN Given: handoff report given  Patient transferred to: Phase II    Handoff Report: Identifed the Patient, Identified the Reponsible Provider, Reviewed the pertinent medical history, Discussed the surgical course, Reviewed Intra-OP anesthesia mangement and issues during anesthesia, Set expectations for post-procedure period and Allowed opportunity for questions and acknowledgement of understanding      Vitals:  Vitals Value Taken Time   /62 06/17/24 1020   Temp 36.2  C (97.2  F) 06/17/24 1020   Pulse     Resp 16 06/17/24 1020   SpO2 96 % 06/17/24 1020       Electronically Signed By: NOEMI Lazaro CRNA  June 17, 2024  10:22 AM

## 2024-06-17 NOTE — ANESTHESIA PREPROCEDURE EVALUATION
Anesthesia Pre-Procedure Evaluation    Patient: Molly Levin   MRN: [id number] : 1970        Procedure : Procedure(s):  COLONOSCOPY          Past Medical History:   Diagnosis Date    ASCUS with positive high risk HPV cervical     pap showed +HPV in , repeat in  still +HPV,ASCUS referred for colpo by primary at [facility]    ANDREA III (cervical intraepithelial neoplasia III)     Leep cone around     Paget's bone disease     not for certain, had elevated alk phos and some bony lesions.  had dexa scan showing mild osteopenia thru her primary      Past Surgical History:   Procedure Laterality Date    ENT SURGERY      LAPAROSCOPY      lysis of adhesions    SURGICAL PATHOLOGY EXAM      LEEP CONE: ANDREA III  per records from her primary doctor    TONSILLECTOMY        No Known Allergies   Social History     Tobacco Use    Smoking status: Former    Smokeless tobacco: Never    Tobacco comments:     in the 80s   Substance Use Topics    Alcohol use: Yes     Alcohol/week: 1.7 standard drinks of alcohol     Types: 2 Glasses of wine per week      Wt Readings from Last 1 Encounters:   24 70.3 kg (155 lb)        Anesthesia Evaluation   Pt has had prior anesthetic.         ROS/MED HX  ENT/Pulmonary:  - neg pulmonary ROS   (+)                tobacco use (remote Hx), Past use,                       Neurologic:  - neg neurologic ROS     Cardiovascular:  - neg cardiovascular ROS     METS/Exercise Tolerance: >4 METS    Hematologic:  - neg hematologic  ROS     Musculoskeletal:  - neg musculoskeletal ROS     GI/Hepatic:  - neg GI/hepatic ROS     Renal/Genitourinary:  - neg Renal ROS     Endo:  - neg endo ROS     Psychiatric/Substance Use:  - neg psychiatric ROS     Infectious Disease:  - neg infectious disease ROS     Malignancy:       Other:            Physical Exam    Airway        Mallampati: II   TM distance: > 3 FB   Neck ROM: full   Mouth opening: > 3 cm    Respiratory Devices and Support         Dental   "     (+) Minor Abnormalities - some fillings, tiny chips      Cardiovascular          Rhythm and rate: regular     Pulmonary           breath sounds clear to auscultation           OUTSIDE LABS:  CBC:   Lab Results   Component Value Date    WBC 6.3 06/18/2014    HGB 13.7 06/18/2014    HCT 38.7 06/18/2014     06/18/2014     BMP:   Lab Results   Component Value Date     06/18/2014    POTASSIUM 3.8 06/18/2014    CHLORIDE 103 06/18/2014    CO2 26 06/18/2014    BUN 11 06/18/2014    CR 0.75 06/18/2014     (H) 06/18/2014     COAGS: No results found for: \"PTT\", \"INR\", \"FIBR\"  POC: No results found for: \"BGM\", \"HCG\", \"HCGS\"  HEPATIC:   Lab Results   Component Value Date    ALBUMIN 4.6 06/18/2014    PROTTOTAL 7.8 06/18/2014    ALT 33 06/18/2014    AST 22 06/18/2014    ALKPHOS 73 06/18/2014    BILITOTAL 0.5 06/18/2014     OTHER:   Lab Results   Component Value Date    YADIRA 9.5 06/18/2014       Anesthesia Plan    ASA Status:  2    NPO Status:  NPO Appropriate    Anesthesia Type: MAC.   Induction: N/a.   Maintenance: TIVA.        Consents    Anesthesia Plan(s) and associated risks, benefits, and realistic alternatives discussed. Questions answered and patient/representative(s) expressed understanding.     - Discussed:     - Discussed with:  Patient            Postoperative Care    Pain management: Multi-modal analgesia.   PONV prophylaxis: Ondansetron (or other 5HT-3), Dexamethasone or Solumedrol     Comments:               Aracelis Espitia MD                    "

## 2024-06-17 NOTE — H&P
Pre-Endoscopy History and Physical     Molly Levin MRN# [id number]   YOB: 1970 Age: 54 year old     Date of Procedure: 06/17/24  Primary care provider: [facility]  Type of Endoscopy: Colonoscopy  Reason for Procedure: Screening  Type of Anesthesia Anticipated: MAC Sedation    HPI:    Molly is a 54 year old female who will be undergoing the above procedure.      A history and physical has been performed. The patient's medications and allergies have been reviewed. The risks and benefits of the procedure and the sedation options and risks were discussed with the patient.  All questions were answered and informed consent was obtained.      She denies any irregular bowel movements, blood in the stool, or changes to her bowel habits.      No current outpatient medications on file.     Current Facility-Administered Medications   Medication Dose Route Frequency Provider Last Rate Last Admin    lactated ringers infusion   Intravenous Continuous Angel Mejía MD        lidocaine (LMX4) kit   Topical Q1H PRN Angel Mejía MD        lidocaine 1 % 0.1-1 mL  0.1-1 mL Other Q1H PRN Angel Mejaí MD        sodium chloride (PF) 0.9% PF flush 3 mL  3 mL Intracatheter Q8H Angel Mejía MD        sodium chloride (PF) 0.9% PF flush 3 mL  3 mL Intracatheter q1 min prn Angel Mejía MD           Patient Active Problem List   Diagnosis    History of hormone replacement therapy    ASCUS with positive high risk HPV cervical        Past Medical History:   Diagnosis Date    ASCUS with positive high risk HPV cervical 2014    pap showed +HPV in 2014, repeat in 2015 still +HPV,ASCUS referred for colpo by primary at [facility]    ANDREA III (cervical intraepithelial neoplasia III)     Leep cone around 2000    Paget's bone disease     not for certain, had elevated alk phos and some bony lesions.  had dexa scan showing mild osteopenia thru her primary        Past Surgical History:   Procedure  "Laterality Date    ENT SURGERY      LAPAROSCOPY      lysis of adhesions    SURGICAL PATHOLOGY EXAM  2000    [facility] CONE: ANDREA III  per records from her primary doctor    TONSILLECTOMY         Social History     Tobacco Use    Smoking status: Former    Smokeless tobacco: Never    Tobacco comments:     in the 80s   Substance Use Topics    Alcohol use: Yes     Alcohol/week: 1.7 standard drinks of alcohol     Types: 2 Glasses of wine per week       History reviewed. No pertinent family history.    REVIEW OF SYSTEMS:     5 point ROS negative except as noted above in HPI, including Gen., Resp., CV, GI &  system review.      PHYSICAL EXAM:   /73   Pulse 71   Temp 97.4  F (36.3  C) (Temporal)   Resp 16   Ht 1.702 m (5' 7\")   Wt 70.3 kg (155 lb)   LMP 12/15/2018 (Approximate)   SpO2 94%   BMI 24.28 kg/m   Estimated body mass index is 24.28 kg/m  as calculated from the following:    Height as of this encounter: 1.702 m (5' 7\").    Weight as of this encounter: 70.3 kg (155 lb).   All Vitals have been reviewed.    GENERAL APPEARANCE: healthy and alert  MENTAL STATUS: alert  RESP: comfortable on room air, no audible wheezing  CV: regular rates and rhythm      IMPRESSION   Molly is a 54 year old female who is here for a colonoscopy.        PLAN:     Plan for colonoscopy. We discussed the risks, benefits and alternatives and the patient wished to proceed.        ROBIN PAZ MD  [facility]  Phone: [phone]  Fax: [phone]  June 17, 2024    "